# Patient Record
Sex: MALE | Race: BLACK OR AFRICAN AMERICAN | NOT HISPANIC OR LATINO
[De-identification: names, ages, dates, MRNs, and addresses within clinical notes are randomized per-mention and may not be internally consistent; named-entity substitution may affect disease eponyms.]

---

## 2021-06-17 PROBLEM — Z00.00 ENCOUNTER FOR PREVENTIVE HEALTH EXAMINATION: Status: ACTIVE | Noted: 2021-06-17

## 2021-07-02 ENCOUNTER — APPOINTMENT (OUTPATIENT)
Dept: ORTHOPEDIC SURGERY | Facility: CLINIC | Age: 61
End: 2021-07-02
Payer: COMMERCIAL

## 2021-07-02 PROCEDURE — 73562 X-RAY EXAM OF KNEE 3: CPT | Mod: 50

## 2021-07-02 PROCEDURE — 73521 X-RAY EXAM HIPS BI 2 VIEWS: CPT

## 2021-07-02 PROCEDURE — 99204 OFFICE O/P NEW MOD 45 MIN: CPT

## 2021-07-02 NOTE — REASON FOR VISIT
[Initial Visit] : an initial visit for [FreeTextEntry2] : BALJIT HIP PAIN AND STIFFNESS (LEFT IS WORSE) SENT FOR NEW XRAYS

## 2021-07-02 NOTE — PHYSICAL EXAM
[de-identified] : Physical exam left hip patient has minimal internal and external rotation with the hip flexed 90°. Neurovascular intact distally with good quad tone. [de-identified] : AP lateral of both hips were obtained showing complete obliteration of the superior joint space of the left hip AP standing individual and sunrise views of the knees were obtained showing the joint spaces well preserved in all 3 compartments

## 2021-07-02 NOTE — DISCUSSION/SUMMARY
[Surgical risks reviewed] : Surgical risks reviewed [de-identified] : We talked about our options I believe the patient did assess with hip replacement surgery the reasonable risks and benefits were discussed in detail patient questions which were answered to his satisfaction talked about the potential complications of the procedure as well as typical convalescent expectations. The potential complications that may require revision surgery such as component loosening migration wear failure infection leg length inequality and instability.

## 2021-07-02 NOTE — HISTORY OF PRESENT ILLNESS
[de-identified] : First time visit for this gentleman he is here with chronic worsening left pain and stiffness patient states that he has difficulty with certain motions such as getting his left shoe. Pain is intermittent but certainly is worsening over time. Localized left groin and hip.

## 2021-10-05 ENCOUNTER — LABORATORY RESULT (OUTPATIENT)
Age: 61
End: 2021-10-05

## 2021-10-05 ENCOUNTER — TRANSCRIPTION ENCOUNTER (OUTPATIENT)
Age: 61
End: 2021-10-05

## 2021-10-05 VITALS
HEART RATE: 53 BPM | WEIGHT: 181.88 LBS | DIASTOLIC BLOOD PRESSURE: 81 MMHG | RESPIRATION RATE: 16 BRPM | OXYGEN SATURATION: 99 % | SYSTOLIC BLOOD PRESSURE: 155 MMHG | TEMPERATURE: 97 F | HEIGHT: 69 IN

## 2021-10-05 RX ORDER — POVIDONE-IODINE 5 %
1 AEROSOL (ML) TOPICAL ONCE
Refills: 0 | Status: COMPLETED | OUTPATIENT
Start: 2021-10-06 | End: 2021-10-06

## 2021-10-05 RX ORDER — INFLUENZA VIRUS VACCINE 15; 15; 15; 15 UG/.5ML; UG/.5ML; UG/.5ML; UG/.5ML
0.5 SUSPENSION INTRAMUSCULAR ONCE
Refills: 0 | Status: DISCONTINUED | OUTPATIENT
Start: 2021-10-06 | End: 2021-10-07

## 2021-10-05 RX ORDER — CHLORHEXIDINE GLUCONATE 213 G/1000ML
1 SOLUTION TOPICAL ONCE
Refills: 0 | Status: DISCONTINUED | OUTPATIENT
Start: 2021-10-06 | End: 2021-10-07

## 2021-10-05 NOTE — H&P ADULT - NSHPLABSRESULTS_GEN_ALL_CORE
Preop CBC, BMP, PT/INR, PTT within normal range  Cr- .93  COVID PCR: ***  UA: neg  3M: DOS  Preop EKG WNL and reviewed per medical clearance Preop CBC, BMP, PT/INR, PTT within normal range  Cr- .93  UA: neg  3M: DOS  Preop EKG WNL and reviewed per medical clearance

## 2021-10-05 NOTE — H&P ADULT - NSHPPHYSICALEXAM_GEN_ALL_CORE
Gen: 60 y/o, NAD  MSK: Decreased left hip ROM secondary to pain    Rest of PE per MD clearance Gen: 60 y/o, NAD  MSK: Decreased left hip ROM secondary to pain  MSK: No deformity or open wounds. No rashes or lesions. EHL/TA/GS/FHL 5/5 BLE. Sensation intact and equal BLE. Skin warm and well perfused. DP palpable BLE. Cap refill brisk.   Rest of PE per MD clearance

## 2021-10-05 NOTE — H&P ADULT - PROBLEM SELECTOR PLAN 1
Admit to Orthopedic Service for elective Dr. Venegas  Medically cleared and optimized for surgery by left total hip replacement.

## 2021-10-05 NOTE — H&P ADULT - HISTORY OF PRESENT ILLNESS
60 yo male with left hip pain x     Presents for elective left total hip replacement. 62 yo male with left hip pain x 10 years after feeling a pain while playing basketball. Denies numbness/tingling/paresthesias to BLE. Pain persists despite conservative measures. Pt ambulates with no assistance at baseline.   Presents for elective left total hip replacement.

## 2021-10-06 ENCOUNTER — INPATIENT (INPATIENT)
Facility: HOSPITAL | Age: 61
LOS: 0 days | Discharge: HOME | DRG: 470 | End: 2021-10-07
Attending: SPECIALIST | Admitting: SPECIALIST
Payer: COMMERCIAL

## 2021-10-06 ENCOUNTER — TRANSCRIPTION ENCOUNTER (OUTPATIENT)
Age: 61
End: 2021-10-06

## 2021-10-06 ENCOUNTER — APPOINTMENT (OUTPATIENT)
Dept: ORTHOPEDIC SURGERY | Facility: HOSPITAL | Age: 61
End: 2021-10-06
Payer: COMMERCIAL

## 2021-10-06 DIAGNOSIS — M16.12 UNILATERAL PRIMARY OSTEOARTHRITIS, LEFT HIP: ICD-10-CM

## 2021-10-06 PROCEDURE — 27130 TOTAL HIP ARTHROPLASTY: CPT | Mod: LT

## 2021-10-06 PROCEDURE — 73501 X-RAY EXAM HIP UNI 1 VIEW: CPT | Mod: 26,LT

## 2021-10-06 PROCEDURE — 27130 TOTAL HIP ARTHROPLASTY: CPT | Mod: AS,LT

## 2021-10-06 RX ORDER — CEFAZOLIN SODIUM 1 G
2000 VIAL (EA) INJECTION EVERY 8 HOURS
Refills: 0 | Status: COMPLETED | OUTPATIENT
Start: 2021-10-06 | End: 2021-10-07

## 2021-10-06 RX ORDER — FAMOTIDINE 10 MG/ML
20 INJECTION INTRAVENOUS EVERY 12 HOURS
Refills: 0 | Status: DISCONTINUED | OUTPATIENT
Start: 2021-10-06 | End: 2021-10-07

## 2021-10-06 RX ORDER — ASPIRIN/CALCIUM CARB/MAGNESIUM 324 MG
325 TABLET ORAL DAILY
Refills: 0 | Status: DISCONTINUED | OUTPATIENT
Start: 2021-10-06 | End: 2021-10-07

## 2021-10-06 RX ORDER — OXYCODONE HYDROCHLORIDE 5 MG/1
20 TABLET ORAL ONCE
Refills: 0 | Status: DISCONTINUED | OUTPATIENT
Start: 2021-10-06 | End: 2021-10-06

## 2021-10-06 RX ORDER — ACETAMINOPHEN 500 MG
650 TABLET ORAL EVERY 6 HOURS
Refills: 0 | Status: DISCONTINUED | OUTPATIENT
Start: 2021-10-06 | End: 2021-10-07

## 2021-10-06 RX ORDER — CELECOXIB 200 MG/1
200 CAPSULE ORAL EVERY 12 HOURS
Refills: 0 | Status: DISCONTINUED | OUTPATIENT
Start: 2021-10-07 | End: 2021-10-07

## 2021-10-06 RX ORDER — OXYCODONE HYDROCHLORIDE 5 MG/1
5 TABLET ORAL
Refills: 0 | Status: DISCONTINUED | OUTPATIENT
Start: 2021-10-06 | End: 2021-10-07

## 2021-10-06 RX ORDER — HYDROMORPHONE HYDROCHLORIDE 2 MG/ML
0.5 INJECTION INTRAMUSCULAR; INTRAVENOUS; SUBCUTANEOUS ONCE
Refills: 0 | Status: DISCONTINUED | OUTPATIENT
Start: 2021-10-06 | End: 2021-10-07

## 2021-10-06 RX ORDER — CELECOXIB 200 MG/1
400 CAPSULE ORAL ONCE
Refills: 0 | Status: COMPLETED | OUTPATIENT
Start: 2021-10-06 | End: 2021-10-06

## 2021-10-06 RX ORDER — ROBINUL 0.2 MG/ML
0.2 INJECTION INTRAMUSCULAR; INTRAVENOUS ONCE
Refills: 0 | Status: COMPLETED | OUTPATIENT
Start: 2021-10-06 | End: 2021-10-06

## 2021-10-06 RX ORDER — SODIUM CHLORIDE 9 MG/ML
1000 INJECTION, SOLUTION INTRAVENOUS
Refills: 0 | Status: DISCONTINUED | OUTPATIENT
Start: 2021-10-07 | End: 2021-10-07

## 2021-10-06 RX ORDER — OXYCODONE HYDROCHLORIDE 5 MG/1
10 TABLET ORAL
Refills: 0 | Status: DISCONTINUED | OUTPATIENT
Start: 2021-10-06 | End: 2021-10-07

## 2021-10-06 RX ORDER — ONDANSETRON 8 MG/1
4 TABLET, FILM COATED ORAL EVERY 4 HOURS
Refills: 0 | Status: DISCONTINUED | OUTPATIENT
Start: 2021-10-06 | End: 2021-10-07

## 2021-10-06 RX ORDER — PANTOPRAZOLE SODIUM 20 MG/1
40 TABLET, DELAYED RELEASE ORAL
Refills: 0 | Status: DISCONTINUED | OUTPATIENT
Start: 2021-10-06 | End: 2021-10-07

## 2021-10-06 RX ORDER — HYDROMORPHONE HYDROCHLORIDE 2 MG/ML
0.5 INJECTION INTRAMUSCULAR; INTRAVENOUS; SUBCUTANEOUS ONCE
Refills: 0 | Status: COMPLETED | OUTPATIENT
Start: 2021-10-06

## 2021-10-06 RX ADMIN — CELECOXIB 400 MILLIGRAM(S): 200 CAPSULE ORAL at 11:23

## 2021-10-06 RX ADMIN — OXYCODONE HYDROCHLORIDE 20 MILLIGRAM(S): 5 TABLET ORAL at 11:23

## 2021-10-06 RX ADMIN — ROBINUL 0.2 MILLIGRAM(S): 0.2 INJECTION INTRAMUSCULAR; INTRAVENOUS at 15:55

## 2021-10-06 RX ADMIN — Medication 1 APPLICATION(S): at 09:13

## 2021-10-06 RX ADMIN — Medication 650 MILLIGRAM(S): at 18:05

## 2021-10-06 RX ADMIN — ONDANSETRON 4 MILLIGRAM(S): 8 TABLET, FILM COATED ORAL at 19:41

## 2021-10-06 NOTE — DISCHARGE NOTE PROVIDER - NSDCCPCAREPLAN_GEN_ALL_CORE_FT
PRINCIPAL DISCHARGE DIAGNOSIS  Diagnosis: Osteoarthritis of left hip  Assessment and Plan of Treatment:        PRINCIPAL DISCHARGE DIAGNOSIS  Diagnosis: Osteoarthritis of left hip  Assessment and Plan of Treatment: s/p Left THR

## 2021-10-06 NOTE — DISCHARGE NOTE PROVIDER - CARE PROVIDER_API CALL
Piyush Rios)  Orthopaedic Surgery  [address] [facility], 10th [place] [address]  Phone: [phone]  Fax: [phone]  Follow Up Time:

## 2021-10-06 NOTE — PHYSICAL THERAPY INITIAL EVALUATION ADULT - ADDITIONAL COMMENTS
Pt lives in [place], [place] with 3 LOC and 13 steps within house. Pt lives with fiance. Pt owns RW and bedside commode.

## 2021-10-06 NOTE — DISCHARGE NOTE PROVIDER - HOSPITAL COURSE
Admitted  Surgery LEFT THR  Usha-op Antibiotics  Pain control  DVT prophylaxis  OOB/Physical Therapy Admitted- 10-6-2021  Surgery - s/p LEFT THR  Usha-op Antibiotics  Pain control  DVT prophylaxis  OOB/Physical Therapy

## 2021-10-06 NOTE — DISCHARGE NOTE PROVIDER - NSDCMRMEDTOKEN_GEN_ALL_CORE_FT
acetaminophen 325 mg oral tablet: 2 tab(s) orally every 6 hours, As Needed for mild pain MDD:8  aspirin 325 mg oral tablet: 1 tab(s) orally once a day MDD:1  celecoxib 200 mg oral capsule: 1 cap(s) orally every 12 hours MDD:2  oxyCODONE 5 mg oral tablet: 1 tab(s) orally every 4 hours, As Needed -Moderate Pain (4 - 6) MDD:6   pantoprazole 40 mg oral delayed release tablet: 1 tab(s) orally once a day (before a meal) MDD:1   acetaminophen 325 mg oral tablet: 2 tab(s) orally every 6 hours, As Needed for mild pain MDD:8  aspirin 325 mg oral tablet: 1 tab(s) orally once a day MDD:1  celecoxib 200 mg oral capsule: 1 cap(s) orally every 12 hours MDD:2  oxyCODONE 5 mg oral tablet: 1 tab(s) orally every 4 hours, As Needed -Moderate Pain (4 - 6) MDD:6   pantoprazole 40 mg oral delayed release tablet: 1 tab(s) orally once a day (before a meal) MDD:1  Physical therapy 3 times a week for 3 weeks Dx: s/p Left Posterior NICOLE:

## 2021-10-06 NOTE — DISCHARGE NOTE PROVIDER - NSDCCPTREATMENT_GEN_ALL_CORE_FT
PRINCIPAL PROCEDURE  Procedure: Total left hip arthroplasty  Findings and Treatment:        PRINCIPAL PROCEDURE  Procedure: Total left hip arthroplasty  Findings and Treatment: osteoarthritis of the left hip

## 2021-10-06 NOTE — PHYSICAL THERAPY INITIAL EVALUATION ADULT - PERTINENT HX OF CURRENT PROBLEM, REHAB EVAL
62 yo male with left hip pain x 10 years after feeling a pain while playing basketball. Denies numbness/tingling/paresthesias to BLE. Pain persists despite conservative measures. Pt ambulates with no assistance at baseline.

## 2021-10-06 NOTE — PHYSICAL THERAPY INITIAL EVALUATION ADULT - DIAGNOSIS, PT EVAL
Impaired Joint Mobility, Motor Function, Muscle Performance, and Range of Motion Associated with Joint Arthroplasty.
COUGH/SHORTNESS OF BREATH

## 2021-10-06 NOTE — DISCHARGE NOTE PROVIDER - NSDCFUADDINST_GEN_ALL_CORE_FT
No strenuous activity, heavy lifting, driving or returning to work until cleared by MD.  You may shower - dressing is water-resistant, no soaking in bathtubs.  Remove dressing after post op day 5-7, then leave incision open to air. Keep incision clean and dry.  Try to have regular bowel movements, take stool softener or laxative if necessary.  Swelling may travel all the way down extremity, this is normal and will subside in a few weeks.  Call to schedule an appt with Dr. Rios for follow up, if you have staples or sutures they will be removed in office.  Contact your doctor if you experience: fever greater than 101.5, chills, chest pain, difficulty breathing, redness or excessive drainage around the incision, other concerns.  No strenuous activity, heavy lifting, driving, excercising, or returning to work until cleared by MD.    You may shower - dressing is water-resistant, no soaking in bathtubs.    Remove dressing after post op day 5-7, then leave incision open to air. Keep incision clean and dry.    Try to have regular bowel movements, take stool softener or laxative if necessary.    Swelling may travel all the way down extremity, this is normal and will subside in a few weeks.    Call to schedule an appt with Dr. Rios for follow up, if you have staples or sutures they will be removed in office.    Contact your doctor if you experience: fever greater than 101.5, chills, chest pain, difficulty breathing, redness or excessive drainage around the incision, other concerns.     Posterior hip precautions to prevent hip dislocation, wear the abduction pillow when in bed    No crossing your legs. Do not bend past your waist.  Use long-handled reach to pick things up if purchased.    Sit in a high chair.  If you do not have a high chair, use cushions on the chair

## 2021-10-07 ENCOUNTER — TRANSCRIPTION ENCOUNTER (OUTPATIENT)
Age: 61
End: 2021-10-07

## 2021-10-07 VITALS
TEMPERATURE: 99 F | SYSTOLIC BLOOD PRESSURE: 120 MMHG | RESPIRATION RATE: 18 BRPM | HEART RATE: 68 BPM | OXYGEN SATURATION: 97 % | DIASTOLIC BLOOD PRESSURE: 64 MMHG

## 2021-10-07 LAB
ANION GAP SERPL CALC-SCNC: 9 MMOL/L — SIGNIFICANT CHANGE UP (ref 5–17)
APTT BLD: 33 SEC — SIGNIFICANT CHANGE UP (ref 27.5–35.5)
BUN SERPL-MCNC: 10 MG/DL — SIGNIFICANT CHANGE UP (ref 7–23)
CALCIUM SERPL-MCNC: 9 MG/DL — SIGNIFICANT CHANGE UP (ref 8.4–10.5)
CHLORIDE SERPL-SCNC: 102 MMOL/L — SIGNIFICANT CHANGE UP (ref 96–108)
CO2 SERPL-SCNC: 24 MMOL/L — SIGNIFICANT CHANGE UP (ref 22–31)
COVID-19 SPIKE DOMAIN AB INTERP: POSITIVE
COVID-19 SPIKE DOMAIN ANTIBODY RESULT: >250 U/ML — HIGH
CREAT SERPL-MCNC: 0.88 MG/DL — SIGNIFICANT CHANGE UP (ref 0.5–1.3)
GLUCOSE SERPL-MCNC: 116 MG/DL — HIGH (ref 70–99)
HCT VFR BLD CALC: 35.4 % — LOW (ref 39–50)
HCV AB S/CO SERPL IA: 0.04 S/CO — SIGNIFICANT CHANGE UP
HCV AB SERPL-IMP: SIGNIFICANT CHANGE UP
HGB BLD-MCNC: 11.4 G/DL — LOW (ref 13–17)
INR BLD: 1.3 — HIGH (ref 0.88–1.16)
MCHC RBC-ENTMCNC: 27.7 PG — SIGNIFICANT CHANGE UP (ref 27–34)
MCHC RBC-ENTMCNC: 32.2 GM/DL — SIGNIFICANT CHANGE UP (ref 32–36)
MCV RBC AUTO: 86.1 FL — SIGNIFICANT CHANGE UP (ref 80–100)
NRBC # BLD: 0 /100 WBCS — SIGNIFICANT CHANGE UP (ref 0–0)
PLATELET # BLD AUTO: 243 K/UL — SIGNIFICANT CHANGE UP (ref 150–400)
POTASSIUM SERPL-MCNC: 4.1 MMOL/L — SIGNIFICANT CHANGE UP (ref 3.5–5.3)
POTASSIUM SERPL-SCNC: 4.1 MMOL/L — SIGNIFICANT CHANGE UP (ref 3.5–5.3)
PROTHROM AB SERPL-ACNC: 15.4 SEC — HIGH (ref 10.6–13.6)
RBC # BLD: 4.11 M/UL — LOW (ref 4.2–5.8)
RBC # FLD: 12.9 % — SIGNIFICANT CHANGE UP (ref 10.3–14.5)
SARS-COV-2 IGG+IGM SERPL QL IA: >250 U/ML — HIGH
SARS-COV-2 IGG+IGM SERPL QL IA: POSITIVE
SODIUM SERPL-SCNC: 135 MMOL/L — SIGNIFICANT CHANGE UP (ref 135–145)
WBC # BLD: 11.97 K/UL — HIGH (ref 3.8–10.5)
WBC # FLD AUTO: 11.97 K/UL — HIGH (ref 3.8–10.5)

## 2021-10-07 PROCEDURE — 97161 PT EVAL LOW COMPLEX 20 MIN: CPT

## 2021-10-07 PROCEDURE — 86900 BLOOD TYPING SEROLOGIC ABO: CPT

## 2021-10-07 PROCEDURE — 97116 GAIT TRAINING THERAPY: CPT

## 2021-10-07 PROCEDURE — 80048 BASIC METABOLIC PNL TOTAL CA: CPT

## 2021-10-07 PROCEDURE — 86850 RBC ANTIBODY SCREEN: CPT

## 2021-10-07 PROCEDURE — 85027 COMPLETE CBC AUTOMATED: CPT

## 2021-10-07 PROCEDURE — C1776: CPT

## 2021-10-07 PROCEDURE — 85610 PROTHROMBIN TIME: CPT

## 2021-10-07 PROCEDURE — 86901 BLOOD TYPING SEROLOGIC RH(D): CPT

## 2021-10-07 PROCEDURE — 36415 COLL VENOUS BLD VENIPUNCTURE: CPT

## 2021-10-07 PROCEDURE — 86803 HEPATITIS C AB TEST: CPT

## 2021-10-07 PROCEDURE — 85730 THROMBOPLASTIN TIME PARTIAL: CPT

## 2021-10-07 PROCEDURE — 73501 X-RAY EXAM HIP UNI 1 VIEW: CPT

## 2021-10-07 PROCEDURE — 86769 SARS-COV-2 COVID-19 ANTIBODY: CPT

## 2021-10-07 RX ORDER — CELECOXIB 200 MG/1
1 CAPSULE ORAL
Qty: 28 | Refills: 0
Start: 2021-10-07 | End: 2021-10-20

## 2021-10-07 RX ORDER — ASPIRIN/CALCIUM CARB/MAGNESIUM 324 MG
1 TABLET ORAL
Qty: 10 | Refills: 0
Start: 2021-10-07 | End: 2021-10-16

## 2021-10-07 RX ORDER — PANTOPRAZOLE SODIUM 20 MG/1
1 TABLET, DELAYED RELEASE ORAL
Qty: 30 | Refills: 0
Start: 2021-10-07 | End: 2021-11-05

## 2021-10-07 RX ORDER — OXYCODONE HYDROCHLORIDE 5 MG/1
1 TABLET ORAL
Qty: 42 | Refills: 0
Start: 2021-10-07 | End: 2021-10-13

## 2021-10-07 RX ORDER — ACETAMINOPHEN 500 MG
2 TABLET ORAL
Qty: 112 | Refills: 0
Start: 2021-10-07 | End: 2021-10-20

## 2021-10-07 RX ADMIN — Medication 650 MILLIGRAM(S): at 12:50

## 2021-10-07 RX ADMIN — Medication 650 MILLIGRAM(S): at 00:33

## 2021-10-07 RX ADMIN — Medication 100 MILLIGRAM(S): at 07:06

## 2021-10-07 RX ADMIN — Medication 100 MILLIGRAM(S): at 00:33

## 2021-10-07 RX ADMIN — Medication 325 MILLIGRAM(S): at 12:50

## 2021-10-07 RX ADMIN — FAMOTIDINE 20 MILLIGRAM(S): 10 INJECTION INTRAVENOUS at 07:06

## 2021-10-07 RX ADMIN — CELECOXIB 200 MILLIGRAM(S): 200 CAPSULE ORAL at 07:07

## 2021-10-07 RX ADMIN — SODIUM CHLORIDE 150 MILLILITER(S): 9 INJECTION, SOLUTION INTRAVENOUS at 04:23

## 2021-10-07 RX ADMIN — FAMOTIDINE 20 MILLIGRAM(S): 10 INJECTION INTRAVENOUS at 00:33

## 2021-10-07 RX ADMIN — Medication 650 MILLIGRAM(S): at 07:07

## 2021-10-07 RX ADMIN — Medication 650 MILLIGRAM(S): at 08:05

## 2021-10-07 RX ADMIN — Medication 650 MILLIGRAM(S): at 13:50

## 2021-10-07 RX ADMIN — Medication 650 MILLIGRAM(S): at 01:30

## 2021-10-07 RX ADMIN — PANTOPRAZOLE SODIUM 40 MILLIGRAM(S): 20 TABLET, DELAYED RELEASE ORAL at 07:06

## 2021-10-07 RX ADMIN — CELECOXIB 200 MILLIGRAM(S): 200 CAPSULE ORAL at 08:05

## 2021-10-07 NOTE — OCCUPATIONAL THERAPY INITIAL EVALUATION ADULT - MODALITIES TREATMENT COMMENTS
Pt close to functional baseline with ADLs and mobility. Educated pt on posterior hip precautions and on how to use reacher and sock aid to perform LB dressing, good carryover. Pt also instructed verbally on how to perform car transfer, with pt verbalizing understanding. No further acute OT needs.

## 2021-10-07 NOTE — OCCUPATIONAL THERAPY INITIAL EVALUATION ADULT - ADDITIONAL COMMENTS
Pt lives with his fiance in a house ~3STE, prior to admit pt independent with ADLs and mobility, does not use AD to ambulate. Pt has a commode, walk-in shower, and shower chair at home.

## 2021-10-07 NOTE — OCCUPATIONAL THERAPY INITIAL EVALUATION ADULT - PERTINENT HX OF CURRENT PROBLEM, REHAB EVAL
62 yo male with left hip pain x 10 years after feeling a pain while playing basketball. Denies numbness/tingling/paresthesias to BLE. Pain persists despite conservative measures. Pt ambulates with no assistance at baseline. Presents for elective left total hip replacement.

## 2021-10-07 NOTE — PROGRESS NOTE ADULT - SUBJECTIVE AND OBJECTIVE BOX
Ortho Post Op Check    Procedure: L NICOLE  Surgeon: Gabriel     Pt comfortable without complaints, pain controlled  Denies CP, SOB, N/V, numbness/tingling     Vital Signs Last 24 Hrs  T(C): 36.4 (10-07-21 @ 00:36), Max: 36.4 (10-07-21 @ 00:36)  T(F): 97.5 (10-07-21 @ 00:36), Max: 97.5 (10-07-21 @ 00:36)  HR: 61 (10-07-21 @ 00:36) (61 - 61)  BP: 133/74 (10-07-21 @ 00:36) (131/78 - 133/74)  BP(mean): --  RR: 18 (10-07-21 @ 00:36) (18 - 20)  SpO2: 98% (10-07-21 @ 00:36) (98% - 99%)    General: Pt Alert and oriented, NAD  Aquacel DSG C/D/I  Pulses: DP 2+  Sensation: SILT grossly SPN/DPN/Saph/radames/tib  Motor: 5/5 Quad/Psoas/TA/GS/EHL    Post-op X-Ray: hardware intact w/o fx    A/P: 61yMale s/p L Posterior NICOLE by Dr. KENY Rios on 10-06  - Stable  - Pain Control  - DVT ppx: ASA  - Post op abx: Ancef  - WBS: WBAT, posterior precautions  - Outpatient PT    Ortho Pager [phone]
Ortho Note      Subjective:  Pt comfortable reporting left hip pain, patient reports pain controlled with current pain medication regimen.   Denies CP, SOB, N/V, numbness/tingling       Vital Signs Last 24 Hrs  T(C): 36.9 (10-07-21 @ 10:15), Max: 36.9 (10-07-21 @ 10:15)  T(F): 98.4 (10-07-21 @ 10:15), Max: 98.4 (10-07-21 @ 10:15)  HR: 76 (10-07-21 @ 10:15) (62 - 76)  BP: 117/66 (10-07-21 @ 10:15) (117/66 - 130/71)  BP(mean): --  RR: 18 (10-07-21 @ 10:15) (17 - 18)  SpO2: 96% (10-07-21 @ 10:15) (96% - 98%)  AVSS    Objective:    Physical Exam:  General: Pt Alert and oriented, NAD  Left Hip aquacel dressing C/D/I  Pulses: +2 pedal pulses, wwp toes, cap refill less than 3 seconds  Sensation: silt intact bilateral lower extremities  Motor: EHL/FHL/TA/GS        Plan of Care:  A/P: 61yMale POD#1 s/p Left anterior NICOLE  - afebrile, nontoxic apperance  - Pain Control- celebrex 200mg PO Q12h, tylenol 650mg PO Q6h, Oxycodone 5-10mg po Q3h prn moderate to severe pain    - DVT ppx: aspirin 325mg PO Daily  - PT, WBS: WBAT   - bowel regemin, IS use , PPI  - Dispo- home pending pt clearance    Ortho Pager [phone]

## 2021-10-07 NOTE — OCCUPATIONAL THERAPY INITIAL EVALUATION ADULT - MANUAL MUSCLE TESTING RESULTS, REHAB EVAL
BUE strength ~5/5 throughout, RLE strength ~5/5 throughout, L hip flexion ~4+/5, L knee flexion/extension ~4+/5, L ankle PF/DF ~5/5

## 2021-10-07 NOTE — OCCUPATIONAL THERAPY INITIAL EVALUATION ADULT - DIAGNOSIS, OT EVAL
Pt presents close to functional baseline, able to complete ADLs with modified independence using reacher and sock aid for LB dressing, as well as mod independent with mobility using RW, no further acute OT needs, pt will be d/c from OT at this time.

## 2021-10-07 NOTE — DISCHARGE NOTE NURSING/CASE MANAGEMENT/SOCIAL WORK - PATIENT PORTAL LINK FT
You can access the FollowMyHealth Patient Portal offered by [facility] by registering at the following website: [email or web address]. By joining [facility]’s FollowMyHealth portal, you will also be able to view your health information using other applications (apps) compatible with our system.

## 2021-10-12 DIAGNOSIS — N40.0 BENIGN PROSTATIC HYPERPLASIA WITHOUT LOWER URINARY TRACT SYMPTOMS: ICD-10-CM

## 2021-10-12 DIAGNOSIS — M16.12 UNILATERAL PRIMARY OSTEOARTHRITIS, LEFT HIP: ICD-10-CM

## 2021-10-12 PROBLEM — M16.9 OSTEOARTHRITIS OF HIP, UNSPECIFIED: Chronic | Status: ACTIVE | Noted: 2021-10-05

## 2021-10-25 ENCOUNTER — APPOINTMENT (OUTPATIENT)
Dept: ORTHOPEDIC SURGERY | Facility: CLINIC | Age: 61
End: 2021-10-25
Payer: COMMERCIAL

## 2021-10-25 PROCEDURE — 73521 X-RAY EXAM HIPS BI 2 VIEWS: CPT

## 2021-10-25 PROCEDURE — 99024 POSTOP FOLLOW-UP VISIT: CPT

## 2021-10-25 NOTE — HISTORY OF PRESENT ILLNESS
[de-identified] : Patient status post left hip replacement is doing quite well he is a cane for ambulation and has started outpatient PT.

## 2021-10-25 NOTE — REASON FOR VISIT
[Post Operative Visit] : a post operative visit for [FreeTextEntry2] : LEFT HIP REPLACED 10/06/21 - 1ST POST OP

## 2021-10-25 NOTE — PHYSICAL EXAM
[de-identified] : Leg lengths are equal patient has appropriate swelling. Wound is well healed no drainage no erythema. [de-identified] : AP lateral of left hip are obtained showing excellent position of the left replacement. Leg lengths are equal.

## 2021-11-29 ENCOUNTER — APPOINTMENT (OUTPATIENT)
Dept: ORTHOPEDIC SURGERY | Facility: CLINIC | Age: 61
End: 2021-11-29
Payer: COMMERCIAL

## 2021-11-29 PROCEDURE — 99024 POSTOP FOLLOW-UP VISIT: CPT

## 2021-11-29 RX ORDER — SILDENAFIL 20 MG/1
20 TABLET ORAL
Qty: 120 | Refills: 0 | Status: ACTIVE | COMMUNITY
Start: 2021-11-05

## 2021-11-29 RX ORDER — ASPIRIN 325 MG/1
325 TABLET, FILM COATED ORAL
Qty: 10 | Refills: 0 | Status: ACTIVE | COMMUNITY
Start: 2021-10-07

## 2021-11-29 NOTE — PHYSICAL EXAM
[de-identified] : Leg lengths are equal patient has appropriate swelling. Wound is well healed no drainage no erythema.

## 2021-11-29 NOTE — HISTORY OF PRESENT ILLNESS
[de-identified] : Patient is approximately 8 weeks status post foot surgery doing quite well but please with her result. He complained of a slight limp but it is improving.

## 2021-11-29 NOTE — DISCUSSION/SUMMARY
[de-identified] : Patient was advised to continue physical therapy to improve left quadriceps tone. He'll follow up with me in 6 weeks' time.

## 2022-01-24 ENCOUNTER — APPOINTMENT (OUTPATIENT)
Dept: ORTHOPEDIC SURGERY | Facility: CLINIC | Age: 62
End: 2022-01-24
Payer: COMMERCIAL

## 2022-01-24 PROCEDURE — 99213 OFFICE O/P EST LOW 20 MIN: CPT

## 2022-01-24 NOTE — PHYSICAL EXAM
[de-identified] : Leg lengths are equal patient has appropriate swelling. Wound is well healed no drainage no erythema.

## 2022-01-24 NOTE — DISCUSSION/SUMMARY
[de-identified] : Patient will return to work with no restrictions early February. Follow up to one year after surgery of his left replacement.

## 2022-01-24 NOTE — HISTORY OF PRESENT ILLNESS
[de-identified] : Patient is status post October 2021 left hip replacement. She is here for evaluation for return to work status. She has no pain and relates without any limp or external support. 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

## 2022-02-07 ENCOUNTER — NON-APPOINTMENT (OUTPATIENT)
Age: 62
End: 2022-02-07

## 2022-11-04 ENCOUNTER — APPOINTMENT (OUTPATIENT)
Dept: ORTHOPEDIC SURGERY | Facility: CLINIC | Age: 62
End: 2022-11-04

## 2022-11-04 VITALS — BODY MASS INDEX: 26.22 KG/M2 | WEIGHT: 177 LBS | HEIGHT: 69 IN

## 2022-11-04 DIAGNOSIS — M16.12 UNILATERAL PRIMARY OSTEOARTHRITIS, LEFT HIP: ICD-10-CM

## 2022-11-04 PROCEDURE — 99212 OFFICE O/P EST SF 10 MIN: CPT | Mod: 25

## 2022-11-04 PROCEDURE — 73521 X-RAY EXAM HIPS BI 2 VIEWS: CPT

## 2022-11-04 RX ORDER — TADALAFIL 5 MG/1
5 TABLET ORAL
Qty: 60 | Refills: 0 | Status: ACTIVE | COMMUNITY
Start: 2022-09-28

## 2022-11-04 NOTE — DISCUSSION/SUMMARY
[de-identified] : Patient is doing quite well hip replacement at the 1 year date.  Patient will follow-up in 5 years for similar evaluation and radiographs.  \par \par Plan.  Patient will continue activities as tolerated follow-up in 5 years.

## 2022-11-04 NOTE — REASON FOR VISIT
[Follow-Up Visit] : a follow-up visit for [Artificial Hip Joint] : an artificial hip joint [FreeTextEntry2] : 1 yr Lt hip replacement f/u

## 2022-11-04 NOTE — HISTORY OF PRESENT ILLNESS
[de-identified] : Patient returns today at the 1 year anniversary of his left hip replacement is doing quite well very pleased he is here just for annual visit and annual radiographs.

## 2022-11-04 NOTE — PHYSICAL EXAM
[de-identified] : Wound is well-healed leg lengths are equal neurovascular intact distally. [de-identified] : Hip x-rays were ordered today.  AP lateral both hips obtained showing a well-positioned left-sided hip replacement no evidence of loosening migration wear.  Comparison made to immediate postop radiographs show no interval change.

## 2023-07-16 NOTE — DISCHARGE NOTE NURSING/CASE MANAGEMENT/SOCIAL WORK - NSDPDISTO_GEN_ALL_CORE
"Saw pt leave room and walk out front door. Security notified. Pt went to car and attempted to leave. EBRSO prevented pt from driving off. Security reported pt "going to store."  still in room 12. CN notified.  " Home

## 2024-07-24 NOTE — PHYSICAL THERAPY INITIAL EVALUATION ADULT - LIGHT TOUCH SENSATION, RLE, REHAB EVAL
[de-identified] : 7/24/24 sinus rhythm 53 bpm with narrow QRS, normal intervals 4/10/24 AF  83 bpm, narrow QRS [de-identified] : Stress 3.6.24 mildly abnormal perfusion with mild ischemia at the apical lateral wall. Nml LVEF and wall motion   [de-identified] : 4/6/24 LVEF 56%, LA 4.5cm, mod-severe MR, mod TR, RVSP 38 TTE 11/19/2020 LVEF 63%, LA 3.6cm, mild MR, mild TR, RVSP <35    within normal limits